# Patient Record
Sex: MALE | Race: OTHER | NOT HISPANIC OR LATINO | Employment: FULL TIME | URBAN - METROPOLITAN AREA
[De-identification: names, ages, dates, MRNs, and addresses within clinical notes are randomized per-mention and may not be internally consistent; named-entity substitution may affect disease eponyms.]

---

## 2023-11-04 ENCOUNTER — OFFICE VISIT (OUTPATIENT)
Dept: URGENT CARE | Facility: CLINIC | Age: 38
End: 2023-11-04
Payer: COMMERCIAL

## 2023-11-04 VITALS
SYSTOLIC BLOOD PRESSURE: 113 MMHG | RESPIRATION RATE: 16 BRPM | TEMPERATURE: 98 F | WEIGHT: 165 LBS | DIASTOLIC BLOOD PRESSURE: 72 MMHG | OXYGEN SATURATION: 99 % | HEART RATE: 71 BPM | HEIGHT: 69 IN | BODY MASS INDEX: 24.44 KG/M2

## 2023-11-04 DIAGNOSIS — S90.851A FOREIGN BODY IN RIGHT FOOT, INITIAL ENCOUNTER: Primary | ICD-10-CM

## 2023-11-04 DIAGNOSIS — T14.8XXA: ICD-10-CM

## 2023-11-04 PROCEDURE — 99204 OFFICE O/P NEW MOD 45 MIN: CPT | Mod: S$GLB,,, | Performed by: NURSE PRACTITIONER

## 2023-11-04 PROCEDURE — 99204 PR OFFICE/OUTPT VISIT, NEW, LEVL IV, 45-59 MIN: ICD-10-PCS | Mod: S$GLB,,, | Performed by: NURSE PRACTITIONER

## 2023-11-04 RX ORDER — CEPHALEXIN 500 MG/1
500 CAPSULE ORAL EVERY 6 HOURS
Qty: 28 CAPSULE | Refills: 0 | Status: SHIPPED | OUTPATIENT
Start: 2023-11-04 | End: 2023-11-11

## 2023-11-04 RX ORDER — FINASTERIDE 5 MG/1
TABLET, FILM COATED ORAL
COMMUNITY
Start: 2023-08-27

## 2023-11-04 RX ORDER — MUPIROCIN 20 MG/G
OINTMENT TOPICAL 3 TIMES DAILY
Qty: 22 G | Refills: 0 | Status: SHIPPED | OUTPATIENT
Start: 2023-11-04

## 2023-11-04 NOTE — PATIENT INSTRUCTIONS
- You must understand that you have received an Urgent Care treatment only and that you may be released before all of your medical problems are known or treated.   - You, the patient, will arrange for follow up care as instructed.   - If your condition worsens or fails to improve we recommend that you receive another evaluation at the ER immediately or contact your PCP to discuss your concerns.   - You can call [phone] or [phone] to help schedule an appointment with the appropriate provider.    Keep area clean and dry  Soak the affected area in warm water and peroxide or epsom salts twice a day  Clean the area with soap and water and apply antibiotic ointment twice a day  Monitor the area for signs of infection: redness, swelling, increasing pain, yellow drainage, warmth  Follow up with Podiatry as needed, a referral has been placed

## 2023-11-04 NOTE — PROCEDURES
Procedures    Verbal consent was obtained from the patient  Prior to procedure, a time out was called   Pt prepped and draped in sterile fashion  Local anesthetic with 2 ml of 2% lidocaine   A small 3 cm incision was made over visible foreign body using an 11 blade scalpel  Wooden foreign body was completely removed using forceps  Wound irrigated w/ 200 cc half peroxide half sterile water  Wound was dressed w/ antibiotic ointment and non stick bandage  Pt tolerated procedure well

## 2023-11-04 NOTE — PROGRESS NOTES
"Subjective:      Patient ID: Kaden Lane is a 38 y.o. male.    Vitals:  height is 5' 9" (1.753 m) and weight is 74.8 kg (165 lb). His oral temperature is 98.1 °F (36.7 °C). His blood pressure is 113/72 and his pulse is 71. His respiration is 16 and oxygen saturation is 99%.     Chief Complaint: Foreign Body in Skin    Pt is a 37 yo male w/ c/o large splinter in right heel. Incident occurred less than an hour ago. Incident happened at home, pt states their wooden floors are older and are splintered. Pt soaked the foot and attempted to remove the splinter without success. Pt took a tylenol/nsaid combo for the pain. Pts last tdap was in January of 2023.     Foreign Body  The incident occurred 1 to 3 hours ago. Suspected object: wooden splinter. Intake: R foot. The incident was witnessed. The incident was witnessed/reported by The patient.   ROS   Objective:     Physical Exam   Constitutional: He is oriented to person, place, and time.   HENT:   Head: Normocephalic.   Ears:   Right Ear: External ear normal.   Left Ear: External ear normal.   Nose: Nose normal.   Mouth/Throat: Mucous membranes are moist.   Eyes: Conjunctivae are normal.   Cardiovascular: Normal rate.   Pulmonary/Chest: Effort normal.   Musculoskeletal: Normal range of motion.         General: Normal range of motion.        Feet:    Neurological: He is alert and oriented to person, place, and time.   Skin: Skin is dry.   Psychiatric: His behavior is normal.           Procedures    Verbal consent was obtained from the patient  Prior to procedure, a time out was called   Pt prepped and draped in sterile fashion  Local anesthetic with 2 ml of 2% lidocaine   A small 3 cm incision was made over visible foreign body using an 11 blade scalpel  Wooden foreign body was completely removed using forceps  Wound irrigated w/ 200 cc half peroxide half sterile water  Wound was dressed w/ antibiotic ointment and non stick bandage  Pt tolerated procedure " well    Assessment:     1. Foreign body in right foot, initial encounter    2. Injury due to foreign body        Plan:       Foreign body in right foot, initial encounter  -     cephALEXin (KEFLEX) 500 MG capsule; Take 1 capsule (500 mg total) by mouth every 6 (six) hours. for 7 days  Dispense: 28 capsule; Refill: 0  -     mupirocin (BACTROBAN) 2 % ointment; Apply topically 3 (three) times daily.  Dispense: 22 g; Refill: 0  -     Ambulatory referral/consult to Podiatry    Injury due to foreign body    Other orders  -     Cancel: Excision of Lesion  -     Cancel: Foot Care      Patient Instructions   - You must understand that you have received an Urgent Care treatment only and that you may be released before all of your medical problems are known or treated.   - You, the patient, will arrange for follow up care as instructed.   - If your condition worsens or fails to improve we recommend that you receive another evaluation at the ER immediately or contact your PCP to discuss your concerns.   - You can call [phone] or [phone] to help schedule an appointment with the appropriate provider.    Keep area clean and dry  Soak the affected area in warm water and peroxide or epsom salts twice a day  Clean the area with soap and water and apply antibiotic ointment twice a day  Monitor the area for signs of infection: redness, swelling, increasing pain, yellow drainage, warmth  Follow up with Podiatry as needed, a referral has been placed